# Patient Record
Sex: FEMALE | Race: WHITE | NOT HISPANIC OR LATINO | Employment: UNEMPLOYED | URBAN - METROPOLITAN AREA
[De-identification: names, ages, dates, MRNs, and addresses within clinical notes are randomized per-mention and may not be internally consistent; named-entity substitution may affect disease eponyms.]

---

## 2024-03-08 ENCOUNTER — HOSPITAL ENCOUNTER (EMERGENCY)
Facility: HOSPITAL | Age: 34
Discharge: HOME | End: 2024-03-08
Attending: EMERGENCY MEDICINE
Payer: COMMERCIAL

## 2024-03-08 VITALS
HEART RATE: 87 BPM | SYSTOLIC BLOOD PRESSURE: 140 MMHG | DIASTOLIC BLOOD PRESSURE: 95 MMHG | RESPIRATION RATE: 16 BRPM | HEIGHT: 60 IN | WEIGHT: 105 LBS | TEMPERATURE: 98.7 F | OXYGEN SATURATION: 97 % | BODY MASS INDEX: 20.62 KG/M2

## 2024-03-08 DIAGNOSIS — L98.9 SKIN LESION: Primary | ICD-10-CM

## 2024-03-08 DIAGNOSIS — T07.XXXA MULTIPLE EXCORIATIONS: ICD-10-CM

## 2024-03-08 PROCEDURE — 99282 EMERGENCY DEPT VISIT SF MDM: CPT

## 2024-03-08 RX ORDER — DIAPER,BRIEF,INFANT-TODD,DISP
1 EACH MISCELLANEOUS 2 TIMES DAILY
Qty: 28 G | Refills: 0 | Status: SHIPPED | OUTPATIENT
Start: 2024-03-08 | End: 2024-03-22

## 2024-03-09 NOTE — ED PROVIDER NOTES
Subjective   History of Present Illness  Patient presents for evaluation of itchy skin lesions that been present for multiple weeks.  She has undergone 2 courses of permethrin from her primary doctor without much relief.  She times times feels as if her skin is crawling though she has not definitively identified any insects.  Her boyfriend who lives in the same place has been having symptoms like this for about a year.  Patient denies any illicit substance use.  No fevers or chills.        Review of Systems    No past medical history on file.    No Known Allergies    No past surgical history on file.    No family history on file.    Social History     Socioeconomic History    Marital status:            Objective   Physical Exam  Constitutional:       General: She is not in acute distress.  HENT:      Head: Normocephalic and atraumatic.   Eyes:      Conjunctiva/sclera: Conjunctivae normal.      Pupils: Pupils are equal, round, and reactive to light.   Cardiovascular:      Rate and Rhythm: Normal rate and regular rhythm.      Pulses: Normal pulses.      Heart sounds: No murmur heard.     No gallop.   Pulmonary:      Effort: Pulmonary effort is normal. No respiratory distress.   Abdominal:      General: Abdomen is flat. There is no distension.      Tenderness: There is no abdominal tenderness.   Musculoskeletal:         General: No swelling or deformity. Normal range of motion.   Skin:     General: Skin is warm and dry.      Capillary Refill: Capillary refill takes less than 2 seconds.      Comments: Multiple skin excoriations on the bilateral upper and lower extremities.  No insects are noted.  The intertriginous areas are seemingly spared   Neurological:      General: No focal deficit present.      Mental Status: She is alert and oriented to person, place, and time.   Psychiatric:         Mood and Affect: Mood normal.         Behavior: Behavior normal.         Procedures           ED Course                     "                         Medical Decision Making  Differential diagnosis includes excoriations, insect bites, eczema or other dermatologic condition, dry skin.  Certainly not consistent with Laboy-Yosef or toxic epidermal necrolysis.  I believe patient could benefit from a trial of hydrocortisone cream which was ordered.  Referral to dermatology was placed.  Patient discharged from the ER in good condition    Risk  OTC drugs.  Prescription drug management.        Final diagnoses:   Skin lesion   Multiple excoriations       ED Disposition  ED Disposition       ED Disposition   Discharge    Condition   Stable    Comment   --           No results found for this or any previous visit (from the past 24 hour(s)).  Note: In addition to lab results from this visit, the labs listed above may include labs taken at another facility or during a different encounter within the last 24 hours. Please correlate lab times with ED admission and discharge times for further clarification of the services performed during this visit.    No orders to display     Vitals:    03/08/24 2216   BP: 140/95   Pulse: 87   Resp: 16   Temp: 98.7 °F (37.1 °C)   SpO2: 97%   Weight: 47.6 kg (105 lb)   Height: 152.4 cm (60\")     Medications - No data to display  ECG/EMG Results (last 24 hours)       ** No results found for the last 24 hours. **          No orders to display           No follow-up provider specified.       Medication List        New Prescriptions      hydrocortisone 0.5 % ointment  Apply 1 Application topically to the appropriate area as directed 2 (Two) Times a Day for 14 days.               Where to Get Your Medications        These medications were sent to [facility] [place] - [address]  - [phone]  - [phone]   [address] [facility] [address]      Phone: [phone]   hydrocortisone 0.5 % ointment            Ryan Viera MD  03/08/24 8520    "

## 2024-03-09 NOTE — DISCHARGE INSTRUCTIONS
Use prescribed hydrocortisone cream on the your skin lesions.  Use over-the-counter Benadryl 25 mg every 8 hours for itching.  A referral to dermatology was placed, they should call you in the next 1-2 business days to schedule a follow-up appointment.  Return to the ER as needed for new or worsening symptoms

## 2025-05-30 PROCEDURE — 87070 CULTURE OTHR SPECIMN AEROBIC: CPT

## 2025-05-30 PROCEDURE — 87205 SMEAR GRAM STAIN: CPT

## 2025-06-05 ENCOUNTER — TELEPHONE (OUTPATIENT)
Dept: URGENT CARE | Facility: CLINIC | Age: 35
End: 2025-06-05
Payer: COMMERCIAL

## 2025-06-05 NOTE — TELEPHONE ENCOUNTER
Pt called inquiring about test results. Informed pt her throat culture came back negative. Pt had no questions or concerns and expressed understanding.